# Patient Record
Sex: FEMALE | Race: WHITE | Employment: FULL TIME | URBAN - NONMETROPOLITAN AREA
[De-identification: names, ages, dates, MRNs, and addresses within clinical notes are randomized per-mention and may not be internally consistent; named-entity substitution may affect disease eponyms.]

---
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
## 2018-01-12 VITALS
SYSTOLIC BLOOD PRESSURE: 108 MMHG | BODY MASS INDEX: 22.53 KG/M2 | HEART RATE: 80 BPM | WEIGHT: 132 LBS | DIASTOLIC BLOOD PRESSURE: 68 MMHG | HEIGHT: 64 IN

## 2018-01-12 RX ORDER — FLUTICASONE PROPIONATE 50 MCG
1 SPRAY, SUSPENSION (ML) NASAL DAILY
COMMUNITY
End: 2018-01-15 | Stop reason: ALTCHOICE

## 2018-01-12 RX ORDER — CETIRIZINE HYDROCHLORIDE 10 MG/1
10 TABLET ORAL DAILY
COMMUNITY
End: 2018-01-15 | Stop reason: ALTCHOICE

## 2018-01-15 ENCOUNTER — OFFICE VISIT (OUTPATIENT)
Dept: FAMILY MEDICINE CLINIC | Age: 18
End: 2018-01-15

## 2018-01-15 VITALS
HEART RATE: 106 BPM | TEMPERATURE: 98.8 F | HEIGHT: 65 IN | RESPIRATION RATE: 16 BRPM | SYSTOLIC BLOOD PRESSURE: 120 MMHG | WEIGHT: 127 LBS | BODY MASS INDEX: 21.16 KG/M2 | OXYGEN SATURATION: 96 % | DIASTOLIC BLOOD PRESSURE: 80 MMHG

## 2018-01-15 DIAGNOSIS — J02.0 ACUTE STREPTOCOCCAL PHARYNGITIS: ICD-10-CM

## 2018-01-15 DIAGNOSIS — Z02.1 PHYSICAL EXAM, PRE-EMPLOYMENT: Primary | ICD-10-CM

## 2018-01-15 DIAGNOSIS — B37.31 VAGINAL YEAST INFECTION: ICD-10-CM

## 2018-01-15 DIAGNOSIS — J39.2 THROAT IRRITATION: ICD-10-CM

## 2018-01-15 LAB — S PYO AG THROAT QL: POSITIVE

## 2018-01-15 PROCEDURE — 99395 PREV VISIT EST AGE 18-39: CPT | Performed by: NURSE PRACTITIONER

## 2018-01-15 PROCEDURE — 87880 STREP A ASSAY W/OPTIC: CPT | Performed by: NURSE PRACTITIONER

## 2018-01-15 RX ORDER — FLUCONAZOLE 150 MG/1
TABLET ORAL
Qty: 2 TABLET | Refills: 0 | Status: SHIPPED | OUTPATIENT
Start: 2018-01-15 | End: 2018-04-19 | Stop reason: ALTCHOICE

## 2018-01-15 RX ORDER — AZITHROMYCIN 500 MG/1
500 TABLET, FILM COATED ORAL DAILY
Qty: 5 TABLET | Refills: 0 | Status: SHIPPED | OUTPATIENT
Start: 2018-01-15 | End: 2018-01-20

## 2018-01-15 NOTE — PATIENT INSTRUCTIONS
Throat: Care Instructions  Your Care Instructions    Strep throat is a bacterial infection that causes sudden, severe sore throat and fever. Strep throat, which is caused by bacteria called streptococcus, is treated with antibiotics. Sometimes a strep test is necessary to tell if the sore throat is caused by strep bacteria. Treatment can help ease symptoms and may prevent future problems. Follow-up care is a key part of your treatment and safety. Be sure to make and go to all appointments, and call your doctor if you are having problems. It's also a good idea to know your test results and keep a list of the medicines you take. How can you care for yourself at home? · Take your antibiotics as directed. Do not stop taking them just because you feel better. You need to take the full course of antibiotics. · Strep throat can spread to others until 24 hours after you begin taking antibiotics. During this time, you should avoid contact with other people at work or home, especially infants and children. Do not sneeze or cough on others, and wash your hands often. Keep your drinking glass and eating utensils separate from those of others, and wash these items well in hot, soapy water. · Gargle with warm salt water at least once each hour to help reduce swelling and make your throat feel better. Use 1 teaspoon of salt mixed in 8 fluid ounces of warm water. · Take an over-the-counter pain medication, such as acetaminophen (Tylenol), ibuprofen (Advil, Motrin), or naproxen (Aleve). Read and follow all instructions on the label. · Try an over-the-counter anesthetic throat spray or throat lozenges, which may help relieve throat pain. · Drink plenty of fluids. Fluids may help soothe an irritated throat. Hot fluids, such as tea or soup, may help your throat feel better. · Eat soft solids and drink plenty of clear liquids.  Flavored ice pops, ice cream, scrambled eggs, sherbet, and gelatin dessert (such as Jell-O) may also

## 2018-04-19 ENCOUNTER — OFFICE VISIT (OUTPATIENT)
Dept: FAMILY MEDICINE CLINIC | Age: 18
End: 2018-04-19
Payer: COMMERCIAL

## 2018-04-19 VITALS
HEART RATE: 86 BPM | RESPIRATION RATE: 16 BRPM | SYSTOLIC BLOOD PRESSURE: 132 MMHG | TEMPERATURE: 98.6 F | WEIGHT: 124 LBS | DIASTOLIC BLOOD PRESSURE: 86 MMHG | OXYGEN SATURATION: 99 % | BODY MASS INDEX: 20.96 KG/M2

## 2018-04-19 DIAGNOSIS — N92.6 IRREGULAR MENSES: Primary | ICD-10-CM

## 2018-04-19 DIAGNOSIS — N94.89 UTERINE CRAMPING: ICD-10-CM

## 2018-04-19 DIAGNOSIS — B37.31 VAGINAL YEAST INFECTION: ICD-10-CM

## 2018-04-19 DIAGNOSIS — Z11.8 SCREENING FOR CHLAMYDIAL DISEASE: ICD-10-CM

## 2018-04-19 LAB
CHLAMYDIA TRACHOMATIS RNA: NORMAL
NEISSERIA GONORRHOEAE RNA: NORMAL

## 2018-04-19 PROCEDURE — 99213 OFFICE O/P EST LOW 20 MIN: CPT | Performed by: NURSE PRACTITIONER

## 2018-04-19 RX ORDER — FLUCONAZOLE 150 MG/1
150 TABLET ORAL ONCE
Qty: 1 TABLET | Refills: 0 | Status: SHIPPED | OUTPATIENT
Start: 2018-04-19 | End: 2018-04-19

## 2018-04-19 RX ORDER — DESOGESTREL AND ETHINYL ESTRADIOL 0.15-0.03
1 KIT ORAL DAILY
Qty: 1 PACKET | Refills: 2 | Status: SHIPPED | OUTPATIENT
Start: 2018-04-19 | End: 2018-07-02 | Stop reason: SDUPTHER

## 2018-04-19 ASSESSMENT — PATIENT HEALTH QUESTIONNAIRE - PHQ9
SUM OF ALL RESPONSES TO PHQ9 QUESTIONS 1 & 2: 0
1. LITTLE INTEREST OR PLEASURE IN DOING THINGS: 0
SUM OF ALL RESPONSES TO PHQ QUESTIONS 1-9: 0
2. FEELING DOWN, DEPRESSED OR HOPELESS: 0

## 2018-07-02 ENCOUNTER — TELEPHONE (OUTPATIENT)
Dept: FAMILY MEDICINE CLINIC | Age: 18
End: 2018-07-02

## 2018-07-02 DIAGNOSIS — N94.89 UTERINE CRAMPING: ICD-10-CM

## 2018-07-02 DIAGNOSIS — N92.6 IRREGULAR MENSES: ICD-10-CM

## 2018-07-02 RX ORDER — DESOGESTREL AND ETHINYL ESTRADIOL 0.15-0.03
1 KIT ORAL DAILY
Qty: 1 PACKET | Refills: 7 | Status: SHIPPED | OUTPATIENT
Start: 2018-07-02 | End: 2018-11-12 | Stop reason: ALTCHOICE

## 2018-07-02 NOTE — TELEPHONE ENCOUNTER
Patient is calling to request a refill of her Birth Control pill. The name is Enskyce. Please send a script into the [facility] in [place]. Has a one week supply left.   Only call patient back if needed at [phone]

## 2018-11-12 ENCOUNTER — OFFICE VISIT (OUTPATIENT)
Dept: FAMILY MEDICINE CLINIC | Age: 18
End: 2018-11-12
Payer: COMMERCIAL

## 2018-11-12 VITALS
TEMPERATURE: 97.2 F | DIASTOLIC BLOOD PRESSURE: 84 MMHG | SYSTOLIC BLOOD PRESSURE: 122 MMHG | HEART RATE: 84 BPM | OXYGEN SATURATION: 98 % | BODY MASS INDEX: 23.39 KG/M2 | WEIGHT: 132 LBS | HEIGHT: 63 IN | RESPIRATION RATE: 10 BRPM

## 2018-11-12 DIAGNOSIS — Z00.00 ENCOUNTER FOR WELLNESS EXAMINATION IN ADULT: Primary | ICD-10-CM

## 2018-11-12 PROCEDURE — 99395 PREV VISIT EST AGE 18-39: CPT | Performed by: NURSE PRACTITIONER

## 2018-11-12 ASSESSMENT — ENCOUNTER SYMPTOMS
SHORTNESS OF BREATH: 0
WHEEZING: 0
DIARRHEA: 0
ABDOMINAL PAIN: 0
COUGH: 0
CONSTIPATION: 0
EYES NEGATIVE: 1

## 2018-11-12 ASSESSMENT — PATIENT HEALTH QUESTIONNAIRE - PHQ9
SUM OF ALL RESPONSES TO PHQ QUESTIONS 1-9: 0
2. FEELING DOWN, DEPRESSED OR HOPELESS: 0
SUM OF ALL RESPONSES TO PHQ QUESTIONS 1-9: 0
1. LITTLE INTEREST OR PLEASURE IN DOING THINGS: 0
SUM OF ALL RESPONSES TO PHQ9 QUESTIONS 1 & 2: 0

## 2018-11-12 NOTE — PROGRESS NOTES
[address] [address] [facility], [address] [place] [address]  Dept: [phone]  Dept Fax: [phone]      Chief Complaint:  Annual Exam    HPI   Patient presents today to establish care, and for wellness exam. Needs physical for nursing school. GYNECOLOGIC HISTORY:    She denies signs of vaginitis. She has no other complaints today. Patient's last menstrual period was 10/22/2018 (approximate). Sexually active: Yes      STD history: No  Birth control method: none  Abnormal bleeding / discharge: No  Cramping: No    No current outpatient prescriptions on file. No current facility-administered medications for this visit. No past medical history on file. No past surgical history on file. Family History   Problem Relation Age of Onset    Other Paternal Uncle         epilepsy       Social History     Social History    Marital status: Single     Spouse name: N/A    Number of children: N/A    Years of education: N/A     Occupational History    nurses aid      Social History Main Topics    Smoking status: Never Smoker    Smokeless tobacco: Never Used    Alcohol use No    Drug use: No    Sexual activity: Not on file     Other Topics Concern    Not on file     Social History Narrative    No narrative on file       No Known Allergies    Patient Active Problem List   Diagnosis    Allergic rhinitis     Health Habits: With regard to her health habits, she eats frequent small meals. She does exercise regularly:   Physical activities include: walking/jogging, 2 times per week, 20 minutes/day. She does not take over the counter vitamins. She wears seatbelts while riding a car. She does not text or talk on the phone while driving. She performs all other ADL's without problem. She is independent, she cooks, drives, bathes, and gets dressed without assistance. She is not . She has 0 children. She does work. She works PRN. She is happy with her life.

## 2018-11-26 ENCOUNTER — NURSE ONLY (OUTPATIENT)
Dept: FAMILY MEDICINE CLINIC | Age: 18
End: 2018-11-26

## 2018-11-26 DIAGNOSIS — Z01.00 ENCOUNTER FOR VISION SCREENING: ICD-10-CM

## 2018-11-26 DIAGNOSIS — Z01.10 HEARING SCREEN WITHOUT ABNORMAL FINDINGS: ICD-10-CM

## 2018-11-26 PROCEDURE — 99999 PR OFFICE/OUTPT VISIT,PROCEDURE ONLY: CPT | Performed by: NURSE PRACTITIONER

## 2019-08-05 ENCOUNTER — OFFICE VISIT (OUTPATIENT)
Dept: FAMILY MEDICINE CLINIC | Age: 19
End: 2019-08-05
Payer: COMMERCIAL

## 2019-08-05 VITALS
WEIGHT: 131 LBS | DIASTOLIC BLOOD PRESSURE: 72 MMHG | SYSTOLIC BLOOD PRESSURE: 118 MMHG | OXYGEN SATURATION: 98 % | BODY MASS INDEX: 23.21 KG/M2 | HEART RATE: 83 BPM

## 2019-08-05 DIAGNOSIS — R53.83 FATIGUE, UNSPECIFIED TYPE: Primary | ICD-10-CM

## 2019-08-05 DIAGNOSIS — R20.2 TINGLING OF LEFT UPPER EXTREMITY: ICD-10-CM

## 2019-08-05 LAB
AGE FOR GFR: 21
ANION GAP SERPL CALCULATED.3IONS-SCNC: 12 MMOL/L
APPEARANCE FLUID: CLEAR
BASOPHILS # BLD: 0.05 THOU/MM3 (ref 0–0.3)
BILIRUBIN, POC: NEGATIVE
BLOOD URINE, POC: NEGATIVE
BUN BLDV-MCNC: 10 MG/DL (ref 7–17)
CALCIUM SERPL-MCNC: 9.5 MG/DL (ref 8.4–10.2)
CHLORIDE BLD-SCNC: 103 MMOL/L (ref 98–120)
CLARITY, POC: CLEAR
CO2: 29 MMOL/L (ref 22–31)
COLOR, POC: YELLOW
CREAT SERPL-MCNC: 0.6 MG/DL (ref 0.5–1)
DIFFERENTIAL: AUTOMATED DIFF
EGFR BF: 153 ML/MIN/1.73 M2
EGFR BM: 206 ML/MIN/1.73 M2
EGFR WF: 126 ML/MIN/1.73 M2
EGFR WM: 170 ML/MIN/1.73 M2
EOSINOPHIL # BLD: 0.08 THOU/MM3 (ref 0–1.1)
FOLATE: 14.9 NG/ML (ref 2.8–20)
GLUCOSE URINE, POC: NEGATIVE
GLUCOSE: 89 MG/DL (ref 65–105)
HBA1C MFR BLD: 5.2 %
HCT VFR BLD CALC: 37.8 % (ref 37–47)
HEMOGLOBIN: 12.9 G/DL (ref 12–16)
KETONES, POC: NEGATIVE
LEUKOCYTE EST, POC: NEGATIVE
LYMPHOCYTES # BLD: 1.56 THOU/MM3 (ref 1–5.5)
MCH RBC QN AUTO: 29.9 PG (ref 28.5–32)
MCHC RBC AUTO-ENTMCNC: 34 G/DL (ref 32–37)
MCV RBC AUTO: 87.8 FL (ref 80–94)
MONOCYTES # BLD: 0.41 THOU/MM3 (ref 0.1–1)
NEUTROPHILS: 2.36 THOU/MM3 (ref 2–8.1)
NITRITE, POC: NEGATIVE
PDW BLD-RTO: 10.8 % (ref 8.5–15.5)
PH, POC: 6.5
PLATELET # BLD: 281 THOU/MM3 (ref 130–400)
PMV BLD AUTO: 8.3 FL (ref 7.4–11)
POTASSIUM SERPL-SCNC: 4.3 MMOL/L (ref 3.6–5)
PROTEIN, POC: NEGATIVE
RBC # BLD: 4.31 M/UL (ref 4.2–5.4)
SODIUM BLD-SCNC: 140 MMOL/L (ref 135–145)
SPECIFIC GRAVITY, POC: 1.02
TSH SERPL DL<=0.05 MIU/L-ACNC: 1.64 MIU/ML (ref 0.49–4.6)
UROBILINOGEN, POC: 0.2
VITAMIN B-12: 297 PG/ML (ref 239–931)
VITAMIN D2, 25 HYDROXY: 35 NG/ML (ref 30–100)
WBC # BLD: 4.46 THOU/ML3 (ref 4.8–10)

## 2019-08-05 PROCEDURE — 81002 URINALYSIS NONAUTO W/O SCOPE: CPT | Performed by: NURSE PRACTITIONER

## 2019-08-05 PROCEDURE — 99213 OFFICE O/P EST LOW 20 MIN: CPT | Performed by: NURSE PRACTITIONER

## 2019-08-05 PROCEDURE — 83036 HEMOGLOBIN GLYCOSYLATED A1C: CPT | Performed by: NURSE PRACTITIONER

## 2019-08-05 ASSESSMENT — PATIENT HEALTH QUESTIONNAIRE - PHQ9
SUM OF ALL RESPONSES TO PHQ QUESTIONS 1-9: 0
SUM OF ALL RESPONSES TO PHQ QUESTIONS 1-9: 0
1. LITTLE INTEREST OR PLEASURE IN DOING THINGS: 0
SUM OF ALL RESPONSES TO PHQ9 QUESTIONS 1 & 2: 0
2. FEELING DOWN, DEPRESSED OR HOPELESS: 0

## 2019-08-05 NOTE — PROGRESS NOTES
Out     Subjective:      Review of Systems   Constitutional: Positive for fatigue. Negative for appetite change, chills and fever. HENT: Negative. Eyes: Positive for visual disturbance (resolved with new contacts). Respiratory: Negative for cough, shortness of breath and wheezing. Cardiovascular: Negative for chest pain, palpitations and leg swelling. Gastrointestinal: Negative for abdominal pain, constipation and diarrhea. Endocrine: Negative for cold intolerance, heat intolerance, polydipsia, polyphagia and polyuria. Genitourinary: Negative. Musculoskeletal: Negative for arthralgias and myalgias. Skin: Negative. Allergic/Immunologic: Negative for environmental allergies and food allergies. Neurological: Positive for numbness (left hand- once for a few minutes). Negative for dizziness, weakness and headaches. Psychiatric/Behavioral: Negative for dysphoric mood and sleep disturbance. The patient is not nervous/anxious. Objective:     /72 (Site: Right Upper Arm, Position: Sitting)   Pulse 83   Wt 131 lb (59.4 kg)   SpO2 98%   BMI 23.21 kg/m²     Physical Exam   Constitutional: She is oriented to person, place, and time. She appears well-developed and well-nourished. HENT:   Head: Normocephalic. Right Ear: Tympanic membrane normal.   Left Ear: Tympanic membrane normal.   Mouth/Throat: Oropharynx is clear and moist.   Eyes: Pupils are equal, round, and reactive to light. Conjunctivae are normal.   Neck: Neck supple. No thyromegaly present. Cardiovascular: Normal rate, regular rhythm and normal heart sounds. Pulses:       Radial pulses are 2+ on the right side, and 2+ on the left side. Pulmonary/Chest: Effort normal and breath sounds normal. She has no wheezes. Abdominal: Soft. Bowel sounds are normal. There is no tenderness. Lymphadenopathy:     She has no cervical adenopathy. Neurological: She is alert and oriented to person, place, and time.  She has normal strength. Psychiatric: Her mood appears anxious. Lab Results   Component Value Date     08/05/2019    K 4.3 08/05/2019     08/05/2019    CO2 29 08/05/2019    BUN 10 08/05/2019    CREATININE 0.6 08/05/2019    GLUCOSE 89 08/05/2019    CALCIUM 9.5 08/05/2019       Lab Results   Component Value Date    LABA1C 5.2 08/05/2019     Lab Results   Component Value Date    CREATININE 0.6 08/05/2019       Assessment:     1. Fatigue, unspecified type    2. Tingling of left upper extremity        Results for POC orders placed in visit on 08/05/19   POCT Urinalysis no Micro   Result Value Ref Range    Color, UA yellow     Clarity, UA clear     Glucose, UA POC negative     Bilirubin, UA negative     Ketones, UA negative     Spec Grav, UA 1.02     Blood, UA POC negative     pH, UA 6.5     Protein, UA POC negative     Urobilinogen, UA 0.2     Leukocytes, UA negative     Nitrite, UA negative     Appearance, Fluid Clear Clear, Slightly Cloudy   POCT glycosylated hemoglobin (Hb A1C)   Result Value Ref Range    Hemoglobin A1C 5.2 %       Plan:     Orders Placed This Encounter   Procedures    TSH without Reflex     Standing Status:   Future     Standing Expiration Date:   8/4/2020    CBC Auto Differential     Standing Status:   Future     Standing Expiration Date:   10/4/2019    Vitamin D 25 Hydroxy     Standing Status:   Future     Standing Expiration Date:   8/4/2020    Vitamin B12 & Folate     Standing Status:   Future     Standing Expiration Date:   8/5/2020    Basic Metabolic Panel     Standing Status:   Future     Standing Expiration Date:   8/4/2020    CBC Auto Differential    Basic Metabolic Panel    TSH without Reflex    Vitamin D 25 Hydroxy    Vitamin B12    Folate    POCT Urinalysis no Micro    POCT glycosylated hemoglobin (Hb A1C)        Return if symptoms worsen or fail to improve. Reassurred patient, monitor for worsening symptoms, call office with any concerns.  All patient questions

## 2019-08-11 ASSESSMENT — ENCOUNTER SYMPTOMS
DIARRHEA: 0
ABDOMINAL PAIN: 0
SHORTNESS OF BREATH: 0
WHEEZING: 0
COUGH: 0
CONSTIPATION: 0

## 2021-05-24 ENCOUNTER — OFFICE VISIT (OUTPATIENT)
Dept: FAMILY MEDICINE CLINIC | Age: 21
End: 2021-05-24
Payer: COMMERCIAL

## 2021-05-24 ENCOUNTER — HOSPITAL ENCOUNTER (OUTPATIENT)
Age: 21
Setting detail: SPECIMEN
Discharge: HOME | End: 2021-05-24
Payer: COMMERCIAL

## 2021-05-24 VITALS
DIASTOLIC BLOOD PRESSURE: 78 MMHG | HEART RATE: 102 BPM | SYSTOLIC BLOOD PRESSURE: 122 MMHG | WEIGHT: 125 LBS | TEMPERATURE: 99.7 F | HEIGHT: 63 IN | OXYGEN SATURATION: 99 % | BODY MASS INDEX: 22.15 KG/M2

## 2021-05-24 DIAGNOSIS — N12 PYELONEPHRITIS: ICD-10-CM

## 2021-05-24 DIAGNOSIS — R30.0 DYSURIA: ICD-10-CM

## 2021-05-24 DIAGNOSIS — T88.7XXA SIDE EFFECT OF MEDICATION: ICD-10-CM

## 2021-05-24 DIAGNOSIS — N12 PYELONEPHRITIS: Primary | ICD-10-CM

## 2021-05-24 LAB
BILIRUBIN, POC: ABNORMAL
BLOOD URINE, POC: ABNORMAL
CLARITY, POC: ABNORMAL
COLOR, POC: ABNORMAL
GLUCOSE URINE, POC: ABNORMAL
KETONES, POC: ABNORMAL
LEUKOCYTE EST, POC: ABNORMAL
NITRITE, POC: POSITIVE
PH, POC: 7
PROTEIN, POC: ABNORMAL
SPECIFIC GRAVITY, POC: 1.01
UROBILINOGEN, POC: 0.2

## 2021-05-24 PROCEDURE — G8427 DOCREV CUR MEDS BY ELIG CLIN: HCPCS | Performed by: NURSE PRACTITIONER

## 2021-05-24 PROCEDURE — 81002 URINALYSIS NONAUTO W/O SCOPE: CPT | Performed by: NURSE PRACTITIONER

## 2021-05-24 PROCEDURE — 99213 OFFICE O/P EST LOW 20 MIN: CPT | Performed by: NURSE PRACTITIONER

## 2021-05-24 PROCEDURE — 87088 URINE BACTERIA CULTURE: CPT

## 2021-05-24 PROCEDURE — 81025 URINE PREGNANCY TEST: CPT | Performed by: NURSE PRACTITIONER

## 2021-05-24 PROCEDURE — 87186 SC STD MICRODIL/AGAR DIL: CPT

## 2021-05-24 PROCEDURE — 87086 URINE CULTURE/COLONY COUNT: CPT

## 2021-05-24 PROCEDURE — G8420 CALC BMI NORM PARAMETERS: HCPCS | Performed by: NURSE PRACTITIONER

## 2021-05-24 PROCEDURE — 1036F TOBACCO NON-USER: CPT | Performed by: NURSE PRACTITIONER

## 2021-05-24 RX ORDER — PHENAZOPYRIDINE HYDROCHLORIDE 200 MG/1
200 TABLET, FILM COATED ORAL 3 TIMES DAILY PRN
Qty: 9 TABLET | Refills: 0 | Status: SHIPPED | OUTPATIENT
Start: 2021-05-24 | End: 2021-05-27

## 2021-05-24 RX ORDER — NORGESTIMATE AND ETHINYL ESTRADIOL 0.25-0.035
1 KIT ORAL DAILY
COMMUNITY
Start: 2020-08-05

## 2021-05-24 RX ORDER — CIPROFLOXACIN 500 MG/1
500 TABLET, FILM COATED ORAL 2 TIMES DAILY
Qty: 10 TABLET | Refills: 0 | Status: SHIPPED | OUTPATIENT
Start: 2021-05-24 | End: 2021-05-29

## 2021-05-24 RX ORDER — FLUCONAZOLE 150 MG/1
TABLET ORAL
Qty: 2 TABLET | Refills: 0 | Status: SHIPPED | OUTPATIENT
Start: 2021-05-24

## 2021-05-24 ASSESSMENT — ENCOUNTER SYMPTOMS
NAUSEA: 0
VOMITING: 0

## 2021-05-24 ASSESSMENT — PATIENT HEALTH QUESTIONNAIRE - PHQ9
1. LITTLE INTEREST OR PLEASURE IN DOING THINGS: 0
SUM OF ALL RESPONSES TO PHQ QUESTIONS 1-9: 0
SUM OF ALL RESPONSES TO PHQ QUESTIONS 1-9: 0

## 2021-05-24 NOTE — PATIENT INSTRUCTIONS
Cipro as directed. Diflucan as needed. Follow up with primary care provider in 1 to 2 days if needed. Patient will be contacted upon receipt of final culture and sensitivity. Any additions or changes to medications or the plan of care will be made at that time. Patient Education        Kidney Infection: Care Instructions  Your Care Instructions     A kidney infection (pyelonephritis) is a type of urinary tract infection, or UTI. Most UTIs are bladder infections. Kidney infections tend to make people much sicker than bladder infections do. A kidney infection is also more serious because it can cause lasting damage if it is not treated quickly. Follow-up care is a key part of your treatment and safety. Be sure to make and go to all appointments, and call your doctor if you are having problems. It's also a good idea to know your test results and keep a list of the medicines you take. How can you care for yourself at home? · Take your antibiotics as directed. Do not stop taking them just because you feel better. You need to take the full course of antibiotics. · Drink plenty of water. This may help wash out bacteria that are causing the infection. If you have kidney, heart, or liver disease and have to limit fluids, talk with your doctor before you increase the amount of fluids you drink. · Urinate often. Try to empty your bladder each time. · To relieve pain, take a hot shower or lay a heating pad (set on low) over your lower belly. Never go to sleep with a heating pad in place. Put a thin cloth between the heating pad and your skin. To help prevent kidney infections  · Drink plenty of water each day. This helps you urinate often, which clears bacteria from your system. If you have kidney, heart, or liver disease and have to limit fluids, talk with your doctor before you increase the amount of fluids you drink. · Urinate when you have the urge. Do not hold your urine for a long time.  Urinate before you

## 2021-05-24 NOTE — PROGRESS NOTES
[address], APRN-CNP  [address]  Phone:  [phone]  Fax:  [phone]  Alan Narvaez is a 19 y.o. female who presents today for her medical conditions/complaints as noted below. Alan Narvaez c/o of Urinary Tract Infection (strated last night, burning, freguency, lower back pain )      HPI:     Urinary Tract Infection   This is a new problem. The current episode started yesterday. The problem has been gradually worsening. The quality of the pain is described as aching. The pain is at a severity of 4/10. There has been no fever. She is sexually active. Associated symptoms include chills, flank pain, frequency, a possible pregnancy and urgency. Pertinent negatives include no nausea or vomiting. She has tried increased fluids for the symptoms. The treatment provided no relief. Wt Readings from Last 3 Encounters:   05/24/21 125 lb (56.7 kg)   08/05/19 131 lb (59.4 kg)   11/12/18 132 lb (59.9 kg)       Temp Readings from Last 3 Encounters:   05/24/21 99.7 °F (37.6 °C)   11/12/18 97.2 °F (36.2 °C) (Tympanic)   04/19/18 98.6 °F (37 °C) (Tympanic)       BP Readings from Last 3 Encounters:   05/24/21 122/78   08/05/19 118/72   11/12/18 122/84       Pulse Readings from Last 3 Encounters:   05/24/21 102   08/05/19 83   11/12/18 84              History reviewed. No pertinent past medical history. History reviewed. No pertinent surgical history.   Family History   Problem Relation Age of Onset    Other Paternal Uncle         epilepsy     Social History     Tobacco Use    Smoking status: Never Smoker    Smokeless tobacco: Never Used   Substance Use Topics    Alcohol use: No      Current Outpatient Medications   Medication Sig Dispense Refill    norgestimate-ethinyl estradiol (ORTHO-CYCLEN) 0.25-35 MG-MCG per tablet Take 1 tablet by mouth daily      ciprofloxacin (CIPRO) 500 MG tablet Take 1 tablet by mouth 2 times daily for 5 days 10 tablet 0  fluconazole (DIFLUCAN) 150 MG tablet Take one daily now and repeat dose in 48 hrs. 2 tablet 0    phenazopyridine (PYRIDIUM) 200 MG tablet Take 1 tablet by mouth 3 times daily as needed for Pain 9 tablet 0     No current facility-administered medications for this visit. No Known Allergies    No exam data present    Subjective:      Review of Systems   Constitutional: Positive for chills. Gastrointestinal: Negative for nausea and vomiting. Genitourinary: Positive for flank pain, frequency and urgency. Objective:     /78   Pulse 102   Temp 99.7 °F (37.6 °C)   Ht 5' 3\" (1.6 m)   Wt 125 lb (56.7 kg)   SpO2 99%   BMI 22.14 kg/m²     Physical Exam  Vitals reviewed. Constitutional:       Appearance: Normal appearance. Abdominal:      Tenderness: There is left CVA tenderness. Skin:     General: Skin is warm and dry. Neurological:      Mental Status: She is alert. Assessment:      Diagnosis Orders   1. Pyelonephritis  POCT HCG, Prenancy, Ur    ciprofloxacin (CIPRO) 500 MG tablet    Culture, Urine   2. Dysuria  POCT Urinalysis no Micro    phenazopyridine (PYRIDIUM) 200 MG tablet   3. Side effect of medication  fluconazole (DIFLUCAN) 150 MG tablet     Results for POC orders placed in visit on 05/24/21   POCT Urinalysis no Micro   Result Value Ref Range    Color, UA      Clarity, UA      Glucose, UA POC neg     Bilirubin, UA neg     Ketones, UA neg     Spec Grav, UA 1.010     Blood, UA POC neg     pH, UA 7.0     Protein, UA POC neg     Urobilinogen, UA 0.2     Leukocytes, UA neg     Nitrite, UA positive            Urine preg is negative. Plan:       Cipro as directed. Diflucan as needed. Follow up with primary care provider in 1 to 2 days if needed. Patient will be contacted upon receipt of final culture and sensitivity. Any additions or changes to medications or the plan of care will be made at that time. Pyridium as needed for pain.         Patient Instructions     Cipro as directed. Diflucan as needed. Follow up with primary care provider in 1 to 2 days if needed. Patient will be contacted upon receipt of final culture and sensitivity. Any additions or changes to medications or the plan of care will be made at that time. Patient Education        Kidney Infection: Care Instructions  Your Care Instructions     A kidney infection (pyelonephritis) is a type of urinary tract infection, or UTI. Most UTIs are bladder infections. Kidney infections tend to make people much sicker than bladder infections do. A kidney infection is also more serious because it can cause lasting damage if it is not treated quickly. Follow-up care is a key part of your treatment and safety. Be sure to make and go to all appointments, and call your doctor if you are having problems. It's also a good idea to know your test results and keep a list of the medicines you take. How can you care for yourself at home? · Take your antibiotics as directed. Do not stop taking them just because you feel better. You need to take the full course of antibiotics. · Drink plenty of water. This may help wash out bacteria that are causing the infection. If you have kidney, heart, or liver disease and have to limit fluids, talk with your doctor before you increase the amount of fluids you drink. · Urinate often. Try to empty your bladder each time. · To relieve pain, take a hot shower or lay a heating pad (set on low) over your lower belly. Never go to sleep with a heating pad in place. Put a thin cloth between the heating pad and your skin. To help prevent kidney infections  · Drink plenty of water each day. This helps you urinate often, which clears bacteria from your system. If you have kidney, heart, or liver disease and have to limit fluids, talk with your doctor before you increase the amount of fluids you drink. · Urinate when you have the urge. Do not hold your urine for a long time.  Urinate before you go to sleep.  · If you have symptoms of a bladder infection, such as burning when you urinate or having to urinate often, call your doctor so you can treat the problem before it gets worse. If you do not treat a bladder infection quickly, it can spread to the kidney. · Men should keep the tip of the penis clean. If you are a woman, keep these ideas in mind:  · Urinate right after you have sex. · Change sanitary pads often. Avoid douches, feminine hygiene sprays, and other feminine hygiene products that have deodorants. · After going to the bathroom, wipe from front to back. When should you call for help? Call your doctor now or seek immediate medical care if:    · You have symptoms that a kidney infection is getting worse. These may include:  ? Pain or burning when you urinate. ? A frequent need to urinate without being able to pass much urine. ? Pain in the flank, which is just below the rib cage and above the waist on either side of the back. ? Blood in the urine. ? A fever.     · You are vomiting or nauseated. Watch closely for changes in your health, and be sure to contact your doctor if:    · You do not get better as expected. Where can you learn more? Go to https://[employer]. org and sign in to your [employer] account. Enter N774 in the [employer] box to learn more about \"Kidney Infection: Care Instructions. \"     If you do not have an account, please click on the \"Sign Up Now\" link. Current as of: December 17, 2020               Content Version: 12.8  © 2006-2021 [employer]. Care instructions adapted under license by [facility] [address] If you have questions about a medical condition or this instruction, always ask your healthcare professional. [address] [address] any warranty or liability for your use of this information. Patient/Caregiver instructed on use, benefit, and side effects of prescribed medications.   All

## 2021-05-27 LAB
CULTURE: ABNORMAL
Lab: ABNORMAL
SPECIMEN DESCRIPTION: ABNORMAL

## 2021-05-28 ENCOUNTER — TELEPHONE (OUTPATIENT)
Dept: FAMILY MEDICINE CLINIC | Age: 21
End: 2021-05-28